# Patient Record
Sex: FEMALE
[De-identification: names, ages, dates, MRNs, and addresses within clinical notes are randomized per-mention and may not be internally consistent; named-entity substitution may affect disease eponyms.]

---

## 2019-04-02 PROBLEM — Z00.00 ENCOUNTER FOR PREVENTIVE HEALTH EXAMINATION: Status: ACTIVE | Noted: 2019-04-02

## 2019-04-18 ENCOUNTER — APPOINTMENT (OUTPATIENT)
Dept: VASCULAR SURGERY | Facility: CLINIC | Age: 74
End: 2019-04-18
Payer: MEDICARE

## 2019-04-18 VITALS
BODY MASS INDEX: 20.83 KG/M2 | HEART RATE: 74 BPM | SYSTOLIC BLOOD PRESSURE: 162 MMHG | HEIGHT: 65 IN | DIASTOLIC BLOOD PRESSURE: 81 MMHG | TEMPERATURE: 98.3 F | WEIGHT: 125 LBS

## 2019-04-18 DIAGNOSIS — I83.813 VARICOSE VEINS OF BILATERAL LOWER EXTREMITIES WITH PAIN: ICD-10-CM

## 2019-04-18 DIAGNOSIS — M79.605 PAIN IN RIGHT LEG: ICD-10-CM

## 2019-04-18 DIAGNOSIS — Z78.9 OTHER SPECIFIED HEALTH STATUS: ICD-10-CM

## 2019-04-18 DIAGNOSIS — M79.604 PAIN IN RIGHT LEG: ICD-10-CM

## 2019-04-18 DIAGNOSIS — I10 ESSENTIAL (PRIMARY) HYPERTENSION: ICD-10-CM

## 2019-04-18 PROCEDURE — 99203 OFFICE O/P NEW LOW 30 MIN: CPT

## 2019-04-18 PROCEDURE — 93970 EXTREMITY STUDY: CPT

## 2019-04-18 RX ORDER — TRIAMTERENE AND HYDROCHLOROTHIAZIDE 50; 75 MG/1; MG/1
TABLET ORAL
Refills: 0 | Status: ACTIVE | COMMUNITY

## 2019-04-18 RX ORDER — AMLODIPINE BESYLATE 5 MG/1
TABLET ORAL
Refills: 0 | Status: ACTIVE | COMMUNITY

## 2019-04-18 NOTE — ASSESSMENT
[FreeTextEntry1] : Ms. ROZ TOMPKINS is a 73 year who presents for initial evaluation of bilateral leg pain for the past several months. \par Patient's leg discomfort is associated with intermittent leg fatigue, heaviness, achiness, restlessness, and muscle cramping. Patient with symptomatic bilateral varicose veins. Arterial perfusion intact with palpable distal pulses.\par Venous duplex shows insufficiency in the tributary veins bilaterally.\par Recommend compression stockings of medical grade 20-30 mmHg to be worn daily. Offered patient bilateral phlebectomy to treat her painful varicosities. Refer patient to orthopedist for further evaluation for her bilateral leg pain. Discussed recommendations with the patient and her daughter. Patient will contact our office if she chooses to proceed with scheduling bilateral phlebectomies.

## 2019-04-18 NOTE — PHYSICAL EXAM
[JVD] : no jugular venous distention  [2+] : left 2+ [de-identified] : facial hyperpigmentation [de-identified] : well appearing female in NAD [de-identified] : non-labored respirations [de-identified] : bilateral lower extremity varicose veins throughout the thigh and calves with minimal hyperpigmentation and hyperkeratosis of the ankle/feet areas. CEAP C$  [de-identified] : reg rate

## 2019-04-18 NOTE — HISTORY OF PRESENT ILLNESS
[FreeTextEntry1] : Ms. ROZ TOMPKINS is a 73 year who presents for initial evaluation of bilateral leg pain for the past several months. \par Patient's leg discomfort is associated with intermittent leg fatigue, heaviness, achiness, restlessness, and muscle cramping.\par Patient complains of bilateral varicose veins that cause her pain.\par Patient's symptoms have persisted despite conservative management with leg elevation. She is unable to exercise. She has tried compression stockings however, she doesn’t like to wear them because they are uncomfortable. \par PMH significant for HTN\par Meds: Amlodipine and triamterene\par NKDA\par

## 2019-04-18 NOTE — REVIEW OF SYSTEMS
[Fever] : no fever [Chills] : no chills [Eyesight Problems] : no eyesight problems [Loss Of Hearing] : no hearing loss [Chest Pain] : no chest pain [Palpitations] : no palpitations [Leg Claudication] : no intermittent leg claudication [Shortness Of Breath] : no shortness of breath [Abdominal Pain] : no abdominal pain [Limb Weakness] : no limb weakness [Easy Bleeding] : no tendency for easy bleeding [Easy Bruising] : no tendency for easy bruising

## 2019-04-18 NOTE — DATA REVIEWED
[FreeTextEntry1] : Lower extremity venous duplex done today demonstrates bilateral venous insufficiency in the tributary veins. No insufficiency noted in the GSV/SSV.  No evidence of DVT.\par

## 2019-07-09 ENCOUNTER — APPOINTMENT (OUTPATIENT)
Dept: DERMATOLOGY | Facility: CLINIC | Age: 74
End: 2019-07-09
Payer: MEDICARE

## 2019-07-09 VITALS
WEIGHT: 125 LBS | BODY MASS INDEX: 20.83 KG/M2 | HEIGHT: 65 IN | SYSTOLIC BLOOD PRESSURE: 140 MMHG | DIASTOLIC BLOOD PRESSURE: 86 MMHG

## 2019-07-09 DIAGNOSIS — Z91.89 OTHER SPECIFIED PERSONAL RISK FACTORS, NOT ELSEWHERE CLASSIFIED: ICD-10-CM

## 2019-07-09 DIAGNOSIS — L71.9 ROSACEA, UNSPECIFIED: ICD-10-CM

## 2019-07-09 DIAGNOSIS — L81.9 DISORDER OF PIGMENTATION, UNSPECIFIED: ICD-10-CM

## 2019-07-09 PROCEDURE — 99203 OFFICE O/P NEW LOW 30 MIN: CPT

## 2019-07-09 RX ORDER — FLUOCINOLONE ACETONIDE, HYDROQUINONE, AND TRETINOIN .1; 40; .5 MG/G; MG/G; MG/G
0.01-4-0.05 CREAM TOPICAL
Qty: 1 | Refills: 0 | Status: ACTIVE | COMMUNITY
Start: 2019-07-09 | End: 1900-01-01

## 2019-07-09 RX ORDER — TRIAMCINOLONE ACETONIDE 1 MG/G
0.1 CREAM TOPICAL
Qty: 30 | Refills: 0 | Status: ACTIVE | COMMUNITY
Start: 2019-01-04

## 2019-07-09 RX ORDER — SODIUM SULFACETAMIDE 100 MG/G
10 LIQUID TOPICAL
Qty: 1 | Refills: 4 | Status: ACTIVE | COMMUNITY
Start: 2019-07-09 | End: 1900-01-01